# Patient Record
Sex: MALE | Race: OTHER | NOT HISPANIC OR LATINO | Employment: UNEMPLOYED | RURAL
[De-identification: names, ages, dates, MRNs, and addresses within clinical notes are randomized per-mention and may not be internally consistent; named-entity substitution may affect disease eponyms.]

---

## 2022-09-20 ENCOUNTER — HOSPITAL ENCOUNTER (EMERGENCY)
Facility: HOSPITAL | Age: 62
Discharge: HOME | End: 2022-09-20
Attending: EMERGENCY MEDICINE
Payer: OTHER GOVERNMENT

## 2022-09-20 VITALS
HEART RATE: 70 BPM | WEIGHT: 280 LBS | DIASTOLIC BLOOD PRESSURE: 109 MMHG | BODY MASS INDEX: 34.82 KG/M2 | RESPIRATION RATE: 18 BRPM | SYSTOLIC BLOOD PRESSURE: 202 MMHG | TEMPERATURE: 98 F | HEIGHT: 75 IN | OXYGEN SATURATION: 97 %

## 2022-09-20 DIAGNOSIS — S49.91XA INJURY OF RIGHT SHOULDER, INITIAL ENCOUNTER: ICD-10-CM
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
## 2022-09-20 DIAGNOSIS — M79.601 PAIN AND SWELLING OF UPPER EXTREMITY, RIGHT: Primary | ICD-10-CM

## 2022-09-20 DIAGNOSIS — M79.89 PAIN AND SWELLING OF UPPER EXTREMITY, RIGHT: Primary | ICD-10-CM

## 2022-09-20 PROCEDURE — 99282 EMERGENCY DEPT VISIT SF MDM: CPT | Mod: ,,, | Performed by: FAMILY MEDICINE

## 2022-09-20 PROCEDURE — 99284 EMERGENCY DEPT VISIT MOD MDM: CPT | Mod: 25

## 2022-09-20 PROCEDURE — 99282 PR EMERGENCY DEPT VISIT,LEVEL II: ICD-10-PCS | Mod: ,,, | Performed by: FAMILY MEDICINE

## 2022-09-20 RX ORDER — NITROGLYCERIN 0.4 MG/1
0.4 TABLET SUBLINGUAL EVERY 5 MIN PRN
COMMUNITY

## 2022-09-20 RX ORDER — AMLODIPINE BESYLATE 10 MG/1
10 TABLET ORAL DAILY
COMMUNITY

## 2022-09-20 RX ORDER — ALLOPURINOL 100 MG/1
100 TABLET ORAL DAILY
COMMUNITY

## 2022-09-20 RX ORDER — CLONIDINE HYDROCHLORIDE 0.2 MG/1
0.2 TABLET ORAL 2 TIMES DAILY
COMMUNITY

## 2022-09-20 RX ORDER — SIMVASTATIN 20 MG/1
20 TABLET, FILM COATED ORAL NIGHTLY
COMMUNITY

## 2022-09-20 RX ORDER — CARVEDILOL 25 MG/1
25 TABLET ORAL 2 TIMES DAILY WITH MEALS
COMMUNITY

## 2022-09-20 RX ORDER — GLIPIZIDE 5 MG/1
2.5 TABLET ORAL
COMMUNITY

## 2022-09-20 RX ORDER — HYDRALAZINE HYDROCHLORIDE 100 MG/1
100 TABLET, FILM COATED ORAL 3 TIMES DAILY
COMMUNITY

## 2022-09-20 RX ORDER — METFORMIN HYDROCHLORIDE 1000 MG/1
1000 TABLET ORAL 2 TIMES DAILY WITH MEALS
COMMUNITY

## 2022-09-20 RX ORDER — HYDROCHLOROTHIAZIDE 12.5 MG/1
12.5 CAPSULE ORAL DAILY
COMMUNITY

## 2022-09-20 RX ORDER — LISINOPRIL 30 MG/1
30 TABLET ORAL DAILY
COMMUNITY

## 2022-09-20 NOTE — ED PROVIDER NOTES
Encounter Date: 9/20/2022       History     Chief Complaint   Patient presents with    Shoulder Pain    Joint Swelling     63 Y/O MALE INMATE WHO FELL 3 WEEKS AGO ONTO HIS RIGHT SHOULDER.  HE HAD SHOULDER PAIN, BUT MORE RECENTLY IN THE PAST FEW DAYS, HE HAS DEVELOPED SWELLING AND PAIN IN HIS RIGHT ARM WITH RIGHT HAND SWELLING.  PAIN IS MODERATE AND IS WORSE WITH ATTEMPTED RANGE OF MOVEMENT AT SHOULDER.      Review of patient's allergies indicates:  No Known Allergies  Past Medical History:   Diagnosis Date    Gout, unspecified     Hypertension     Mixed hyperlipidemia      History reviewed. No pertinent surgical history.  History reviewed. No pertinent family history.  Social History     Tobacco Use    Smoking status: Former     Types: Cigarettes    Smokeless tobacco: Never   Substance Use Topics    Alcohol use: Not Currently    Drug use: Not Currently     Review of Systems   Musculoskeletal:  Positive for arthralgias and myalgias.   All other systems reviewed and are negative.    Physical Exam     Initial Vitals [09/20/22 1727]   BP Pulse Resp Temp SpO2   (!) 202/109 70 18 98.2 °F (36.8 °C) 97 %      MAP       --         Physical Exam    Nursing note and vitals reviewed.  Constitutional: He appears well-developed and well-nourished.   HENT:   Head: Normocephalic and atraumatic.   Nose: Nose normal.   Mouth/Throat: Oropharynx is clear and moist.   Eyes: Conjunctivae and EOM are normal. Pupils are equal, round, and reactive to light.   Neck: Neck supple.   Normal range of motion.  Cardiovascular:  Normal rate, regular rhythm and normal heart sounds.           Pulmonary/Chest: Breath sounds normal.   Abdominal: Abdomen is soft. Bowel sounds are normal.   Musculoskeletal:      Cervical back: Normal range of motion and neck supple.      Comments: PAIN RIGHT SHOULDER AND RIGHT UPPER ARM WITH SWELLING OF RIGHT ARM AND HAND.       Neurological: He is alert and oriented to person, place, and time. He has normal strength.  GCS score is 15. GCS eye subscore is 4. GCS verbal subscore is 5. GCS motor subscore is 6.   Skin: Skin is warm. Capillary refill takes less than 2 seconds.   Psychiatric: He has a normal mood and affect.       Medical Screening Exam   See Full Note    ED Course   Procedures  Labs Reviewed - No data to display       Imaging Results              US Upper Extremity Veins Right (Final result)  Result time 09/20/22 18:53:23      Final result by Mars Mota II, MD (09/20/22 18:53:23)                   Impression:      No evidence of deep venous thrombosis.    Ultrasound images stored and captured.      Electronically signed by: Mars Mota  Date:    09/20/2022  Time:    18:53               Narrative:    EXAMINATION:  US UPPER EXTREMITY VEINS RIGHT    CLINICAL HISTORY:  SWELLING AND PAIN;    TECHNIQUE:  Duplex and color flow Doppler and dynamic compression was performed of the lower extremity veins was performed.    COMPARISON:  None.    FINDINGS:  No evidence of echogenic, non-compressible thrombus seen in the visualized veins of the extremities.  Color Doppler venous waveform pattern is within normal limits.                                       X-Ray Humerus 2 View Right (Final result)  Result time 09/20/22 18:49:30      Final result by Mars Mota II, MD (09/20/22 18:49:30)                   Impression:      No acute injury.      Electronically signed by: Mars Mota  Date:    09/20/2022  Time:    18:49               Narrative:    EXAMINATION:  XR SHOULDER COMPLETE 2 OR MORE VIEWS RIGHT; XR HUMERUS 2 VIEW RIGHT    CLINICAL HISTORY:  Unspecified fall, initial encounter    COMPARISON:  None available    FINDINGS:  No evidence of fracture seen.  The alignment of the joints appears normal.  Mild-to-moderate acromioclavicular joint degenerative change is present.  No soft tissue abnormality is seen.                                       X-Ray Forearm Right (Final result)  Result time 09/20/22 18:49:55       Final result by Mars Mota II, MD (09/20/22 18:49:55)                   Impression:      No evidence of acute injury demonstrated      Electronically signed by: Mars Mota  Date:    09/20/2022  Time:    18:49               Narrative:    EXAMINATION:  XR FOREARM RIGHT    CLINICAL HISTORY:  Unspecified fall, initial encounter    COMPARISON:  None available    FINDINGS:  No evidence of fracture seen.  The alignment of the joints appears normal.  Mild elbow and moderate wrist degenerative change is present.  No soft tissue abnormality is seen.                                       X-Ray Shoulder Complete 2 View Right (Final result)  Result time 09/20/22 18:49:30      Final result by Mars Mota II, MD (09/20/22 18:49:30)                   Impression:      No acute injury.      Electronically signed by: Mars Mota  Date:    09/20/2022  Time:    18:49               Narrative:    EXAMINATION:  XR SHOULDER COMPLETE 2 OR MORE VIEWS RIGHT; XR HUMERUS 2 VIEW RIGHT    CLINICAL HISTORY:  Unspecified fall, initial encounter    COMPARISON:  None available    FINDINGS:  No evidence of fracture seen.  The alignment of the joints appears normal.  Mild-to-moderate acromioclavicular joint degenerative change is present.  No soft tissue abnormality is seen.                                       Medications - No data to display                    Clinical Impression:   Final diagnoses:  [W19.XXXA] Fall  [M79.601, M79.89] Pain and swelling of upper extremity, right (Primary)  [S49.91XA] Injury of right shoulder, initial encounter        ED Disposition Condition    Discharge Stable          ED Prescriptions    None       Follow-up Information    None          Ella Hawkins MD  09/20/22 1914       Ella Hawkins MD  09/29/22 0634

## 2022-09-20 NOTE — ED TRIAGE NOTES
Presents to ED from [facility] after he states he fell on his right arm, c/o right arm and hand pain and swelling.

## 2022-09-21 NOTE — DISCHARGE INSTRUCTIONS
Keep the right arm above the level of her heart to help with the swelling. removed the Ace bandage here finger started to feel numb or your fingers feel cold.  He did not want to decreased circulation to her fingers.  He has been referred to Dr. Mckee for Orthopedics.  His office will call you with an appointment time.

## 2022-09-26 PROBLEM — S49.91XA INJURY OF RIGHT SHOULDER: Status: ACTIVE | Noted: 2022-09-26

## 2022-10-21 PROBLEM — M75.121 COMPLETE TEAR OF RIGHT ROTATOR CUFF: Status: ACTIVE | Noted: 2022-10-21
